# Patient Record
Sex: FEMALE | Race: WHITE
[De-identification: names, ages, dates, MRNs, and addresses within clinical notes are randomized per-mention and may not be internally consistent; named-entity substitution may affect disease eponyms.]

---

## 2017-06-26 ENCOUNTER — HOSPITAL ENCOUNTER (EMERGENCY)
Dept: HOSPITAL 25 - UCCORT | Age: 32
Discharge: HOME | End: 2017-06-26
Payer: COMMERCIAL

## 2017-06-26 VITALS — DIASTOLIC BLOOD PRESSURE: 72 MMHG | SYSTOLIC BLOOD PRESSURE: 145 MMHG

## 2017-06-26 DIAGNOSIS — J45.901: Primary | ICD-10-CM

## 2017-06-26 DIAGNOSIS — B34.9: ICD-10-CM

## 2017-06-26 DIAGNOSIS — G35: ICD-10-CM

## 2017-06-26 DIAGNOSIS — M32.9: ICD-10-CM

## 2017-06-26 PROCEDURE — G0463 HOSPITAL OUTPT CLINIC VISIT: HCPCS

## 2017-06-26 PROCEDURE — 71020: CPT

## 2017-06-26 PROCEDURE — 99212 OFFICE O/P EST SF 10 MIN: CPT

## 2017-06-26 NOTE — UC
Respiratory Complaint HPI





- HPI Summary


HPI Summary: 





This is a 33 yo female with lupus, MS and mild intermittent asthma who 

presented with a 3d h/o productive cough, malaise and chest tightness.  She 

reports chest tightness and thick yellow sputum production.  She has 

generalized feeling of malaise.  She has been using decongestants but did not 

trial use of bronchodilator.  Denies sick contacts.





- History of Current Complaint


Chief Complaint: UCGeneralIllness


Stated Complaint: UPPER RESPIRATORY


Hx Last Menstrual Period: doesn't get - on nexplanon





- Allergies/Home Medications


Allergies/Adverse Reactions: 


 Allergies











Allergy/AdvReac Type Severity Reaction Status Date / Time


 


Hydromorphone [From Dilaudid] Allergy  Difficulty Verified 06/26/17 14:31





   Breathing  


 


Penicillins [PCN] Allergy  Hives Verified 06/26/17 14:31











Home Medications: 


 Home Medications





Acetaminophen-Guaifenesin [Comtrex Deep Chest Cold M 325-200 mg] 1 tab PO Q6H 

PRN 06/26/17 [History Confirmed 06/26/17]


Cholecalciferol TAB* [Vitamin D TAB*] 4,000 units PO DAILY 06/26/17 [History 

Confirmed 06/26/17]


Etonogestrel [Nexplanon] 68 mg IMPLANT 06/26/17 [History]


Glatiramer Acetate [Copaxone] 40 mg SC SEE INSTRUCTIONS 06/26/17 [History 

Confirmed 06/26/17]











PMH/Surg Hx/FS Hx/Imm Hx


Previously Healthy: No - lupus, MS, mild intermittent asthma





- Surgical History


Surgical History: Yes


Surgery Procedure, Year, and Place: 3 c-sections.  gallbladder x2 surg.  

appendectomy





- Social History


Alcohol Use: None


Substance Use Type: None


Smoking Status (MU): Never Smoked Tobacco





Review of Systems


Constitutional: Fever, Chills, Fatigue


Skin: Negative


Eyes: Negative


ENT: Sore Throat


Respiratory: Shortness Of Breath, Cough


Cardiovascular: Negative


Gastrointestinal: Negative


Genitourinary: Negative


Motor: Negative


Neurovascular: Negative


Musculoskeletal: Negative


Neurological: Negative


Psychological: Negative


All Other Systems Reviewed And Are Negative: Yes





Physical Exam


Triage Information Reviewed: Yes


Appearance: Ill-Appearing - mildly


Vital Signs: 


 Initial Vital Signs











Temp  98.0 F   06/26/17 14:35


 


Pulse  97   06/26/17 14:35


 


Resp  16   06/26/17 14:35


 


BP  145/72   06/26/17 14:35


 


Pulse Ox  99   06/26/17 14:35











Vital Signs Reviewed: Yes


ENT: Positive: Normal ENT inspection


Neck: Positive: Supple, Nontender, No Lymphadenopathy


Respiratory: Positive: Lungs clear, Normal breath sounds, No respiratory 

distress.  Negative: Crackles, Rhonchi, Stridor, Wheezing


Cardiovascular Exam: Normal


Cardiovascular: Positive: RRR, No Murmur


Skin Exam: Normal


Skin: Negative: rashes





UC Diagnostic Evaluation





- Laboratory


O2 Sat by Pulse Oximetry: 99


Diagnostic Studies Comment: XR chest - NAD





Respiratory Course/Dx





- Course


Course Of Treatment: This is a 33 yo female with asthma, lupus, and MS who 

presents with 3 days of congestion, chest tightness cough and malaise.  No 

infiltrate on CXR.  Treat for viral illness with asthma exacerbation





- Differential Dx/Diagnosis


Differential Diagnosis/HQI/PQRI: Asthma, Bronchitis, Lower Resp Infection, 

Sinusitis


Provider Diagnoses: 1. Asthma exacerbation.  2. Viral respiratory illness





Discharge





- Discharge Plan


Condition: Stable


Disposition: HOME


Prescriptions: 


Albuterol HFA INHALER* [Ventolin HFA Inhaler*] 1 - 2 puff INH Q4H PRN #1 mdi


 PRN Reason: SOB/cough


Azithromyxin LUCINDA (NF) [Z-Lucinda (Zithromax) 250 mg tabs #6] 2 tab PO .TODAY, THEN 

1 DAILY #6 tab


Patient Education Materials:  Bronchospasm (ED)


Referrals: 


No Primary Care Phys,NOPCP [Primary Care Provider] - 


Additional Instructions: 


Acitivity: As tolerated





Instructions:


1. Use albuterol regularly for the next several days with decongestants and the 

Zpak


2. If symptoms continue for another 3-4 days, please follow up with your 

primary care provider for further testing

## 2017-06-26 NOTE — RAD
HISTORY: Cough



COMPARISONS: August 21, 2015



VIEWS: 2: Frontal dual-energy and lateral views of the chest.



FINDINGS:

CARDIOMEDIASTINAL SILHOUETTE: The cardiomediastinal silhouette is normal.

DEJON: The dejon are normal.

PLEURA: The costophrenic angles are sharp. No pleural abnormalities are noted.

LUNG PARENCHYMA: The lungs are clear.

ABDOMEN: The upper abdomen is clear. There is no subphrenic gas.

BONES AND SOFT TISSUES: No bone or soft tissue abnormalities are noted.

OTHER: None.



IMPRESSION:

NO ACTIVE CARDIOPULMONARY DISEASE.

## 2018-04-24 ENCOUNTER — HOSPITAL ENCOUNTER (EMERGENCY)
Dept: HOSPITAL 25 - UCCORT | Age: 33
Discharge: HOME | End: 2018-04-24
Payer: COMMERCIAL

## 2018-04-24 VITALS — SYSTOLIC BLOOD PRESSURE: 148 MMHG | DIASTOLIC BLOOD PRESSURE: 87 MMHG

## 2018-04-24 DIAGNOSIS — Z88.5: ICD-10-CM

## 2018-04-24 DIAGNOSIS — J20.9: Primary | ICD-10-CM

## 2018-04-24 DIAGNOSIS — Z88.0: ICD-10-CM

## 2018-04-24 PROCEDURE — 87651 STREP A DNA AMP PROBE: CPT

## 2018-04-24 PROCEDURE — 71046 X-RAY EXAM CHEST 2 VIEWS: CPT

## 2018-04-24 PROCEDURE — 99212 OFFICE O/P EST SF 10 MIN: CPT

## 2018-04-24 PROCEDURE — 87502 INFLUENZA DNA AMP PROBE: CPT

## 2018-04-24 PROCEDURE — G0463 HOSPITAL OUTPT CLINIC VISIT: HCPCS

## 2018-06-04 ENCOUNTER — HOSPITAL ENCOUNTER (EMERGENCY)
Dept: HOSPITAL 25 - UCCORT | Age: 33
Discharge: HOME | End: 2018-06-04
Payer: COMMERCIAL

## 2018-06-04 VITALS — SYSTOLIC BLOOD PRESSURE: 123 MMHG | DIASTOLIC BLOOD PRESSURE: 61 MMHG

## 2018-06-04 DIAGNOSIS — L98.8: Primary | ICD-10-CM

## 2018-06-04 PROCEDURE — 99212 OFFICE O/P EST SF 10 MIN: CPT

## 2018-06-04 PROCEDURE — 87186 SC STD MICRODIL/AGAR DIL: CPT

## 2018-06-04 PROCEDURE — 87205 SMEAR GRAM STAIN: CPT

## 2018-06-04 PROCEDURE — 87640 STAPH A DNA AMP PROBE: CPT

## 2018-06-04 PROCEDURE — G0463 HOSPITAL OUTPT CLINIC VISIT: HCPCS

## 2018-06-04 PROCEDURE — 87077 CULTURE AEROBIC IDENTIFY: CPT

## 2018-06-04 PROCEDURE — 87070 CULTURE OTHR SPECIMN AEROBIC: CPT

## 2018-06-04 PROCEDURE — 87641 MR-STAPH DNA AMP PROBE: CPT

## 2018-06-04 NOTE — UC
General HPI





- HPI Summary


HPI Summary: 





pt is c/o "lesions popping up all over". "looks like boils". denies hx of MRSA 

or hot tub use. no fever.  rash for about 1 week. multiple spots on front of 

trunk. hx Lupus, not sure if related. begins as red spots that get bigger then 

rupture.





- History of Current Complaint


Chief Complaint: UCSkin


Stated Complaint: BOIL ON STOMACH


Time Seen by Provider: 06/04/18 15:00


Hx Obtained From: Patient


Hx Last Menstrual Period: pt has nexplanon and states not getting a regular 

menses


Onset/Duration: Gradual Onset


Timing: Constant


Pain Intensity: 2


Associated Signs & Symptoms: Negative: Fever





- Allergy/Home Medications


Allergies/Adverse Reactions: 


 Allergies











Allergy/AdvReac Type Severity Reaction Status Date / Time


 


hydromorphone [From Dilaudid] Allergy  Difficulty Verified 06/04/18 15:16





   Breathing  


 


Penicillins Allergy  Hives Verified 06/04/18 15:16











Home Medications: 


 Home Medications





Folic Acid TAB* [Folvite TAB*] 1 mg PO DAILY 06/04/18 [History Confirmed 06/04/ 18]











PMH/Surg Hx/FS Hx/Imm Hx





- Additional Past Medical History


Additional PMH: 





MS, Lupus





- Surgical History


Surgical History: Yes


Surgery Procedure, Year, and Place: 3 c-sections.  gallbladder x2 surg.  

appendectomy





- Family History


Known Family History: Positive: Hypertension, Seizure Disorder, Other - 

connective tissue disease, PE





- Social History


Occupation: Employed Full-time


Lives: With Family


Alcohol Use: Rare


Substance Use Type: None


Smoking Status (MU): Never Smoked Tobacco





- Immunization History


Vaccination Up to Date: Yes





Review of Systems


Constitutional: Negative


Skin: Rash


Eyes: Negative


ENT: Negative


Respiratory: Negative


Cardiovascular: Negative


Gastrointestinal: Negative


Genitourinary: Negative


Motor: Negative


Neurovascular: Negative


Musculoskeletal: Negative


Neurological: Negative


Psychological: Negative


All Other Systems Reviewed And Are Negative: Yes





Physical Exam


Triage Information Reviewed: Yes


Appearance: Well-Appearing


Vital Signs: 


 Initial Vital Signs











Temp  97.3 F   06/04/18 15:07


 


Pulse  98   06/04/18 15:07


 


Resp  17   06/04/18 15:07


 


BP  123/61   06/04/18 15:07


 


Pulse Ox  100   06/04/18 15:07











Vital Signs Reviewed: Yes


Eyes: Positive: Conjunctiva Clear


ENT: Positive: Pharynx normal, TMs normal.  Negative: Nasal congestion, Nasal 

drainage


Neck: Positive: Supple, Nontender, No Lymphadenopathy


Respiratory: Positive: Lungs clear, Normal breath sounds


Cardiovascular: Positive: RRR, No Murmur


Abdomen Description: Positive: Nontender, No Organomegaly, Soft.  Negative: 

Distended, Guarding


Bowel Sounds: Positive: Present


Musculoskeletal: Positive: ROM Intact


Neurological: Positive: Alert


Psychological: Positive: Age Appropriate Behavior


Skin Exam: Normal


Skin: Positive: rashes - several red spots to anterior trunk, some with tiny 

pustules and some scabbed over. not petechial or blistering. No scale.





Course/Dx





- Course


Course Of Treatment: does not look fungal and not c/w infestation. not typical 

of Lupus. ? MRSA.  PROCEDURE: tip of tiny needle used to unroof 3 tiny pustules 

and culture obtained.  will cover for mrsa and refer to dermatology.





- Differential Dx - Multi-Symptom


Provider Diagnoses: small pustules anterior trunk/abdomen





Discharge





- Sign-Out/Discharge


Documenting (check all that apply): Discharge/Admit/Transfer





- Discharge Plan


Condition: Stable


Disposition: HOME


Prescriptions: 


Sulfamethox/Trimethoprim DS* [Bactrim /160 TAB*] 1 tab PO BID #14 tab


Patient Education Materials:  MRSA (Methicillin-Resistant Staphylococcus Aureus

) (ED), Acute Rash (ED)


Referrals: 


Gildardo Cordon MD [Medical Doctor] - 7 Days





- Billing Disposition and Condition


Condition: STABLE


Disposition: Home

## 2018-11-19 ENCOUNTER — HOSPITAL ENCOUNTER (EMERGENCY)
Dept: HOSPITAL 25 - UCCORT | Age: 33
Discharge: HOME | End: 2018-11-19
Payer: COMMERCIAL

## 2018-11-19 VITALS — SYSTOLIC BLOOD PRESSURE: 147 MMHG | DIASTOLIC BLOOD PRESSURE: 79 MMHG

## 2018-11-19 DIAGNOSIS — B96.20: ICD-10-CM

## 2018-11-19 DIAGNOSIS — Z88.0: ICD-10-CM

## 2018-11-19 DIAGNOSIS — N39.0: Primary | ICD-10-CM

## 2018-11-19 DIAGNOSIS — Z88.5: ICD-10-CM

## 2018-11-19 LAB
HCT VFR BLD AUTO: 40 % (ref 35–47)
HGB BLD-MCNC: 13.3 G/DL (ref 12–16)
MCH RBC QN AUTO: 29 PG (ref 27–31)
MCHC RBC AUTO-ENTMCNC: 34 G/DL (ref 31–36)
MCV RBC AUTO: 87 FL (ref 80–97)
PLATELET # BLD AUTO: 235 10^3/UL (ref 150–450)
RBC # BLD AUTO: 4.56 10^6/UL (ref 4–5.4)
WBC # BLD AUTO: 6.5 10^3/UL (ref 3.5–10.8)

## 2018-11-19 PROCEDURE — 87186 SC STD MICRODIL/AGAR DIL: CPT

## 2018-11-19 PROCEDURE — G0463 HOSPITAL OUTPT CLINIC VISIT: HCPCS

## 2018-11-19 PROCEDURE — 81003 URINALYSIS AUTO W/O SCOPE: CPT

## 2018-11-19 PROCEDURE — 85027 COMPLETE CBC AUTOMATED: CPT

## 2018-11-19 PROCEDURE — 36415 COLL VENOUS BLD VENIPUNCTURE: CPT

## 2018-11-19 PROCEDURE — 99212 OFFICE O/P EST SF 10 MIN: CPT

## 2018-11-19 PROCEDURE — 80053 COMPREHEN METABOLIC PANEL: CPT

## 2018-11-19 PROCEDURE — 84702 CHORIONIC GONADOTROPIN TEST: CPT

## 2018-11-19 PROCEDURE — 87086 URINE CULTURE/COLONY COUNT: CPT

## 2018-11-19 PROCEDURE — 87077 CULTURE AEROBIC IDENTIFY: CPT

## 2018-11-19 NOTE — UC
Complaint Female HPI





- HPI Summary


HPI Summary: 





34 y/o female with h/o gastric bypass 10/2018, lupus, presents wtih dark urine 

x 4-5 days.  Patient states noted decreased urine output several days ago x 24 

hours, urine increased in amount but very dark  + L flank pain, achy, moderate.

   Denies colicy pain.   h/o URI x 1-2 weeks ago, resolved on own.   NO urinary 

burning, frequency, urgency.    + bloudy, no odor.   





- History Of Current Complaint


Chief Complaint: UCGU


Stated Complaint: URINARY


Time Seen by Provider: 11/19/18 14:46


Hx Obtained From: Patient


Hx Last Menstrual Period: 11/7/18


Pregnant?: No


Onset/Duration: Sudden Onset, Lasting Days


Timing: Constant


Severity Initially: Moderate


Severity Currently: Moderate


Pain Intensity: 5


Pain Scale Used: 0-10 Numeric





- Allergies/Home Medications


Allergies/Adverse Reactions: 


 Allergies











Allergy/AdvReac Type Severity Reaction Status Date / Time


 


hydromorphone [From Dilaudid] Allergy  Difficulty Verified 11/19/18 14:43





   Breathing  


 


Penicillins Allergy  Hives Verified 11/19/18 14:43











Home Medications: 


 Home Medications





Multivitamin [Multivitamins] 1 each PO DAILY 11/19/18 [History Confirmed 11/19/ 18]











PMH/Surg Hx/FS Hx/Imm Hx


Previously Healthy: No - lupus, s/p gastric bypass 





- Surgical History


Surgical History: Yes


Surgery Procedure, Year, and Place: 3 c-sections.  gallbladder x2 surg.  

appendectomy.  Gastric bypass 10/18





- Family History


Known Family History: Positive: Hypertension, Seizure Disorder, Other - 

connective tissue disease, PE





- Social History


Alcohol Use: Rare


Substance Use Type: None


Smoking Status (MU): Never Smoked Tobacco





- Immunization History


Vaccination Up to Date: Yes





Review of Systems


All Other Systems Reviewed And Are Negative: Yes


Constitutional: Negative: Fever, Chills


Genitourinary: Positive: Other - decrased urine output, dark urine.  Negative: 

Dysuria, Hematuria, Frequency, Urgency


Is Patient Immunocompromised?: No





Physical Exam


Triage Information Reviewed: Yes


Appearance: Well-Appearing, No Pain Distress, Well-Nourished


Vital Signs: 


 Initial Vital Signs











Temp  98.6 F   11/19/18 14:35


 


Pulse  98   11/19/18 14:35


 


Resp  16   11/19/18 14:35


 


BP  147/79   11/19/18 14:35


 


Pulse Ox  100   11/19/18 14:35











Eyes: Positive: Conjunctiva Clear


Abdomen Description: Positive: Nontender, No Organomegaly, Soft, Bruit, CVA 

Tenderness (L) - minimal, Other: - TTP over SI joint b/l with moderate 

palpation..  Negative: CVA Tenderness (R), Distended, Guarding, Hepatomegaly, 

McBurney's Point Tenderness, Splenomegaly


Musculoskeletal Exam: Normal


Psychological Exam: Normal





 Complaint Female Dx





- Course


Course Of Treatment: Due to decreased UO, labs sent, r/o nephrotic syndrome.   

+ leuk, + blood, treat for UTI, urine cultures sent, started on ABX, increase 

fluid intake.  Discussed with DR. Bowie.





- Differential Dx/Diagnosis


Differential Diagnosis/HQI/PQRI: Cervicitis, Ectopic, Ovarian Cyst, Renal Colic


Provider Diagnoses: UTI





Discharge





- Sign-Out/Discharge


Documenting (check all that apply): Patient Departure


All imaging exams completed and their final reports reviewed: No Studies





- Discharge Plan


Condition: Good


Disposition: HOME


Prescriptions: 


Nitrofurantoin Monohyd/M-Cryst [Macrobid 100 mg Capsule] 100 mg PO BID #6 cap


Patient Education Materials:  Urinary Tract Infection in Women (ED)


Referrals: 


No Primary Care Phys,NOPCP [Primary Care Provider] - 


Additional Instructions: 


- Antibiotics as directed x 3 days 


- BLood work, urine cultures to be completed, results should be completed in 1-

2 days 


- Return with decreased orine output, increased pain, increased swelling, chest 

pain 


- Increase fluid intake  


- Avoid NSAIDS (motrin, ibuprofen, alleve) until results returned 








- Billing Disposition and Condition


Condition: GOOD


Disposition: Home

## 2018-11-22 NOTE — UC
- Progress Note


Progress Note: 





+ e. coli


on macrobid


+ sensitive


no change


ljj 11/22/2018





Discharge





- Sign-Out/Discharge


Documenting (check all that apply): Post-Discharge Follow Up


All imaging exams completed and their final reports reviewed: No Studies





- Discharge Plan


Condition: Good


Disposition: HOME


Prescriptions: 


Nitrofurantoin Monohyd/M-Cryst [Macrobid 100 mg Capsule] 100 mg PO BID #6 cap


Patient Education Materials:  Urinary Tract Infection in Women (ED)


Referrals: 


No Primary Care Phys,NOPCP [Primary Care Provider] - 


Additional Instructions: 


- Antibiotics as directed x 3 days 


- BLood work, urine cultures to be completed, results should be completed in 1-

2 days 


- Return with decreased orine output, increased pain, increased swelling, chest 

pain 


- Increase fluid intake  


- Avoid NSAIDS (motrin, ibuprofen, alleve) until results returned 








- Billing Disposition and Condition


Condition: GOOD


Disposition: Home

## 2019-11-09 ENCOUNTER — HOSPITAL ENCOUNTER (EMERGENCY)
Dept: HOSPITAL 25 - UCCORT | Age: 34
Discharge: HOME | End: 2019-11-09
Payer: COMMERCIAL

## 2019-11-09 VITALS — SYSTOLIC BLOOD PRESSURE: 120 MMHG | DIASTOLIC BLOOD PRESSURE: 70 MMHG

## 2019-11-09 DIAGNOSIS — Z88.5: ICD-10-CM

## 2019-11-09 DIAGNOSIS — Z88.0: ICD-10-CM

## 2019-11-09 DIAGNOSIS — G35: ICD-10-CM

## 2019-11-09 DIAGNOSIS — J18.9: Primary | ICD-10-CM

## 2019-11-09 PROCEDURE — G0463 HOSPITAL OUTPT CLINIC VISIT: HCPCS

## 2019-11-09 PROCEDURE — 71046 X-RAY EXAM CHEST 2 VIEWS: CPT

## 2019-11-09 PROCEDURE — 84702 CHORIONIC GONADOTROPIN TEST: CPT

## 2019-11-09 PROCEDURE — 99212 OFFICE O/P EST SF 10 MIN: CPT

## 2019-11-09 NOTE — UC
Respiratory Complaint HPI





- HPI Summary


HPI Summary: 


Patient  is 34 year old female, who  present today  to the urgent care with 

cough for past  4 days. 


Reports Coughing, fatigue, body aches, weak, sinus congestion  Pt reports 

albuterol nebulizer she did last night after her inhaler treatment helped.


Cough is productive of sputum.


Reports of fever, MAXIMUM TEMPERATURE at 100F yesterday


Mild sore throat


Denies any new soap, detergent , cosmetics, food or a possible exposure. 


Denies any chest pain or shortness of breath . Denies any abdominal pain , 

nausea or vomiting , diarrhea or constipation.  














- History of Current Complaint


Chief Complaint: UCGeneralIllness


Stated Complaint: FEVER,CONGESTION,COUGH


Time Seen by Provider: 19 12:24


Hx Obtained From: Patient


Hx Last Menstrual Period: 19


Pregnant?: No


Pain Intensity: 0





- Allergies/Home Medications


Allergies/Adverse Reactions: 


 Allergies











Allergy/AdvReac Type Severity Reaction Status Date / Time


 


hydromorphone [From Dilaudid] Allergy  Difficulty Verified 19 12:28





   Breathing  


 


Penicillins Allergy  Hives Verified 19 12:28











Home Medications: 


 Home Medications





Acetaminophen [Tylenol Extra Strength] 1 tab PO ONCE 19 [History 

Confirmed 19]











PMH/Surg Hx/FS Hx/Imm Hx





- Additional Past Medical History


Additional PMH: 


Past Medical History : Lupus, MS


Past Surgical History: , , cholecystectomy, appendicectomy, gastric 

bypass


Family History : non contributory 


Social History : rare alcohol, non smoker, no drug use.  








Previously Healthy: Yes





- Surgical History


Surgical History: Yes


Surgery Procedure, Year, and Place: 3 c-sections.  gallbladder x2 surg.  

appendectomy.  Gastric bypass 10/18





- Family History


Known Family History: Positive: Hypertension, Seizure Disorder, Other - 

connective tissue disease, PE, Non-Contributory





- Social History


Alcohol Use: Rare


Substance Use Type: None


Smoking Status (MU): Never Smoked Tobacco





- Immunization History


Vaccination Up to Date: Yes





Review of Systems


All Other Systems Reviewed And Are Negative: Yes


Constitutional: Positive: Fever, Fatigue


Skin: Positive: Negative


Eyes: Positive: Negative


ENT: Positive: Negative, Sore Throat - Mild, Sinus Congestion


Respiratory: Positive: Cough - Productive.  Negative: Shortness Of Breath


Cardiovascular: Positive: Negative.  Negative: Chest Pain


Gastrointestinal: Positive: Negative


Genitourinary: Positive: Negative


Motor: Positive: Negative


Neurovascular: Positive: Negative


Musculoskeletal: Positive: Negative


Neurological: Positive: Negative


Psychological: Positive: Negative


Is Patient Immunocompromised?: No





Physical Exam





- Summary


Physical Exam Summary: 


Physical Exam: 


Const: Appears well. No signs of apparent distress present. Alert and oriented 

x 3.


Musculo: Walks with a normal gait.


Head/Face: Atraumatic, normocephalic on inspection.


Eyes: EOMI and PERRLA  in both eyes. Conjunctivae clear. No discharge noted 


ENT: Hearing normal, TM normal appearing bilaterally


No tenderness  to palpation on maxillary and frontal sinus. 


Minimal if any pharyngeal erythema without any exudates . Uvula is midline. 


No cervical or submandibular lymphadenopathy noted. 


Respiratory: Respirations are unlabored.  Decreased air entry in the right 

lower lobe with mild rhonchi and crackles.  No wheezing noted 


CVS:  Regular rate and Rhythm, S1S2 normal , no murmurs identified. 


Extremities: Peripheral circulation is grossly normal. Pulses 2+


Abdomen : Soft non tender ,  nondistended ,  Bowel sounds present .  No 

guarding , rebound tenderness  or  rigidity noted. 


Skin: No lesions or rash located on the upper extremities or on the lower 

extremities. 


Neuro: Cranial nerves  II to XII intact, motor and sensory intact.  DTR Intact  

bilaterally. Mood is normal. Affect is normal.








Triage Information Reviewed: Yes


Vital Signs: 


 Initial Vital Signs











Temp  97.1 F   19 12:29


 


Pulse  85   19 12:29


 


Resp  18   19 12:29


 


BP  120/70   19 12:29


 


Pulse Ox  100   19 12:29











Vital Signs Reviewed: Yes





Diagnostics





- Radiology


  ** No standard instances


Radiology Interpretation Completed By: Radiologist - Chest x-ray: IMPRESSION: 

NO ACTIVE CARDIOPULMONARY DISEASE.





Respiratory Course/Dx





- Course


Course Of Treatment: 


Urine pregnancy test is negative


Chest x-ray: IMPRESSION: NO ACTIVE CARDIOPULMONARY DISEASE.


She was given 1 nebulizer treatment of albuterol and she is feeling much better.


Based on her clinical exam and history of lupus and MS, I will treat her with 

antibiotics at this time.  e prescribed to the pharmacy











- Differential Dx/Diagnosis


Provider Diagnosis: 


 Atypical pneumonia








Discharge ED





- Sign-Out/Discharge


Documenting (check all that apply): Patient Departure


All imaging exams completed and their final reports reviewed: Yes





- Discharge Plan


Condition: Stable


Disposition: HOME


Prescriptions: 


Albuterol 2.5MG/3ML (0.083%)* [Ventolin 2.5 MG/3 ML NEB.SOL*] 2.5 mg INH Q6H 

PRN 7 Days #1 neb.sol


 PRN Reason: Shortness Of Breath


Azithromyxin LUCINDA (NF) [Z-Lucinda (Zithromax) 250 mg tabs #6] 2 tab PO .TODAY, THEN 

1 DAILY #6 tab


Patient Education Materials:  Pneumonia (ED)


Referrals: 


No Primary Care Phys,NOPCP [Primary Care Provider] - 


Additional Instructions: 


Please start taking the medication as prescribed to the pharmacy . 


Albuterol nebulizer solution has been E prescribed.  Can use it every 6 hours 

as needed.


Follow up with your primary care doctor in 1 week ( [facility]) 


Return to Urgent care / ER if symptoms get worse. 








- Billing Disposition and Condition


Condition: STABLE


Disposition: Home

## 2019-12-11 ENCOUNTER — HOSPITAL ENCOUNTER (EMERGENCY)
Dept: HOSPITAL 25 - UCCORT | Age: 34
Discharge: HOME | End: 2019-12-11
Payer: COMMERCIAL

## 2019-12-11 VITALS — SYSTOLIC BLOOD PRESSURE: 134 MMHG | DIASTOLIC BLOOD PRESSURE: 67 MMHG

## 2019-12-11 DIAGNOSIS — Z88.0: ICD-10-CM

## 2019-12-11 DIAGNOSIS — M79.10: ICD-10-CM

## 2019-12-11 DIAGNOSIS — J02.9: Primary | ICD-10-CM

## 2019-12-11 DIAGNOSIS — R51: ICD-10-CM

## 2019-12-11 DIAGNOSIS — Z20.818: ICD-10-CM

## 2019-12-11 DIAGNOSIS — Z88.5: ICD-10-CM

## 2019-12-11 PROCEDURE — 99212 OFFICE O/P EST SF 10 MIN: CPT

## 2019-12-11 PROCEDURE — G0463 HOSPITAL OUTPT CLINIC VISIT: HCPCS

## 2019-12-11 PROCEDURE — 87651 STREP A DNA AMP PROBE: CPT

## 2019-12-11 NOTE — UC
Throat Pain/Nasal James HPI





- HPI Summary


HPI Summary: 


Patient is a 35yo female presenting with sore throat, HA, chills, and bodaches 

x1 day. Patient states that 5 out of 7 people at home have tested positive for 

strep throat and are currently being treated. Patient denies nasal congestion, 

ear pain, and cough. Unsure of any fevers. Denies relief with ibuprofen. 

Patient states that she is leaving for [place] on Friday and asked if she 

may be able to receive antibiotics still if the strep test is negative.








- History of Current Complaint


Chief Complaint: UCRespiratory


Stated Complaint: FEVER/SORE THROAT


Hx Obtained From: Patient


Hx Last Menstrual Period: 12/11/19


Onset/Duration: Sudden Onset


Severity: Moderate


Pain Intensity: 6


Pain Scale Used: 0-10 Numeric





- Allergies/Home Medications


Allergies/Adverse Reactions: 


 Allergies











Allergy/AdvReac Type Severity Reaction Status Date / Time


 


hydromorphone [From Dilaudid] Allergy  Difficulty Verified 12/11/19 16:45





   Breathing  


 


Penicillins Allergy  Hives Verified 12/11/19 16:45














PMH/Surg Hx/FS Hx/Imm Hx


Previously Healthy: Yes





- Surgical History


Surgical History: Yes


Surgery Procedure, Year, and Place: 3 c-sections.  gallbladder x2 surg.  

appendectomy.  Gastric bypass 10/18





- Family History


Known Family History: Positive: Hypertension, Seizure Disorder, Other - 

connective tissue disease, PE, Non-Contributory





- Social History


Alcohol Use: Rare


Substance Use Type: None


Smoking Status (MU): Never Smoked Tobacco





- Immunization History


Vaccination Up to Date: Yes





Review of Systems


All Other Systems Reviewed And Are Negative: Yes


Constitutional: Positive: Chills, Fatigue


ENT: Positive: Sore Throat.  Negative: Ear Ache, Nasal Discharge, Sinus 

Congestion


Respiratory: Positive: Negative.  Negative: Shortness Of Breath, Cough


Cardiovascular: Positive: Negative


Gastrointestinal: Positive: Negative


Musculoskeletal: Positive: Myalgia - general body aches


Neurological: Positive: Headache





Physical Exam


Triage Information Reviewed: Yes


Appearance: Well-Appearing, No Pain Distress, Well-Nourished


Vital Signs: 


 Initial Vital Signs











Temp  98.7 F   12/11/19 16:46


 


Pulse  78   12/11/19 16:46


 


Resp  18   12/11/19 16:46


 


BP  134/67   12/11/19 16:46


 


Pulse Ox  100   12/11/19 16:46








 Lab Results











  12/11/19 Range/Units





  16:48 


 


Group A Strep Rapid  Negative  (Negative)  











Vital Signs Reviewed: Yes


Eyes: Positive: Conjunctiva Clear


ENT: Positive: Hearing grossly normal, Pharyngeal erythema, TMs normal, 

Tonsillar swelling, Uvula midline.  Negative: Nasal congestion, Nasal drainage, 

Tonsillar exudate


Neck: Positive: Supple, Nontender, Enlarged Nodes @


Respiratory Exam: Normal


Respiratory: Positive: Lungs clear, Normal breath sounds, No respiratory 

distress, No accessory muscle use.  Negative: Crackles, Rhonchi, Stridor, 

Wheezing


Cardiovascular Exam: Normal


Cardiovascular: Positive: RRR


Neurological: Positive: Alert


Psychological: Positive: Age Appropriate Behavior


Skin Exam: Normal





Throat Pain/Nasal Course/Dx





- Course


Course Of Treatment: 


Negative rapid strep test. I treated patient with Keflex for symptomatic strep 

throat exposure. Patient has allergy of hives reaction to penicillins but 

states she has taken Keflex in the past without issue. Instructed to continue 

with symptomatic treatment and follow up with pcp if symptoms persist. Patient 

voiced understanding and agreed with treatment plan.








- Differential Dx/Diagnosis


Differential Diagnosis/HQI/PQRI: Pharyngitis, Tonsillitis, URI


Provider Diagnosis: 


 Exposure to strep throat, Sore throat








Discharge ED





- Sign-Out/Discharge


Documenting (check all that apply): Patient Departure


All imaging exams completed and their final reports reviewed: No Studies





- Discharge Plan


Condition: Stable


Disposition: HOME


Prescriptions: 


Cephalexin CAP* [Keflex CAP*] 500 mg PO BID #20 cap


Patient Education Materials:  Pharyngitis (ED)


Referrals: 


[facility] [Outside]


[facility] [Outside]


Additional Instructions: 


As discussed, you tested negative for strep throat today but will be treated 

due to your exposure to strep throat.


Take Keflex as prescribed for the treatment of possible strep throat.


You may take ibuprofen and/or tylenol as directed for fever and pain relief.


You may use over the counter throat sprays or lozenges for symptomatic relief.


Get plenty of rest and fluids.


Follow up with your primary care provider or one of the referrals listed below 

if symptoms do not resolve within 10 days.








- Billing Disposition and Condition


Condition: STABLE


Disposition: Home

## 2022-06-13 NOTE — UC
Respiratory Complaint HPI





- HPI Summary


HPI Summary: 





33 yo female with 1 1/2 day hx of f/c, myalgias, runny nose, sore throat and 

blood tinged sputum





had flu 4-5 mos ago complicated by pneumonia





no n/v/d





hx MS and LUPUS











- History of Current Complaint


Chief Complaint: UCRespiratory


Stated Complaint: COUGH, FEVER, ACHY


Time Seen by Provider: 04/24/18 16:54


Hx Obtained From: Patient


Hx Last Menstrual Period: implanon


Onset/Duration: Sudden Onset, Lasting Days


Timing: Constant


Severity Initially: Mild


Severity Currently: Moderate


Pain Intensity: 4


Pain Scale Used: 0-10 Numeric


Character: Cough: Productive, Sputum Description: - green and blood tinged


Aggravating Factors: Nothing


Alleviating Factors: Nothing


Associated Signs And Symptoms: Positive: Fever, Chills, Nasal Congestion


Related History: Similar Episode/Dx as: - influenza





- Allergies/Home Medications


Allergies/Adverse Reactions: 


 Allergies











Allergy/AdvReac Type Severity Reaction Status Date / Time


 


hydromorphone [From Dilaudid] Allergy  Difficulty Verified 04/24/18 16:45





   Breathing  


 


Penicillins Allergy  Hives Verified 04/24/18 16:45











Home Medications: 


 Home Medications





Methotrexate TAB* 4 tab WEEKLY 04/24/18 [History Confirmed 04/24/18]











PMH/Surg Hx/FS Hx/Imm Hx


Previously Healthy: Yes - LUPUS/MS


Respiratory History: Asthma, Bronchitis, Pneumonia





- Surgical History


Surgical History: Yes


Surgery Procedure, Year, and Place: 3 c-sections.  gallbladder x2 surg.  

appendectomy





- Family History


Known Family History: Positive: Hypertension, Other - connective tissue disease





- Social History


Alcohol Use: Rare


Substance Use Type: None


Smoking Status (MU): Never Smoked Tobacco





Review of Systems


Constitutional: Fever, Chills, Fatigue


Skin: Negative


Eyes: Negative


ENT: Sore Throat, Nasal Discharge, Sinus Congestion


Respiratory: Cough


Cardiovascular: Negative


Gastrointestinal: Negative


Genitourinary: Negative


Motor: Negative


Neurovascular: Negative


Musculoskeletal: Myalgia


Neurological: Headache


Psychological: Negative


Is Patient Immunocompromised?: No


All Other Systems Reviewed And Are Negative: Yes





Physical Exam


Triage Information Reviewed: Yes


Appearance: Well-Appearing, No Pain Distress, Well-Nourished


Vital Signs: 


 Initial Vital Signs











Temp  98 F   04/24/18 16:49


 


Pulse  102   04/24/18 16:49


 


Resp  16   04/24/18 16:49


 


BP  148/87   04/24/18 16:49


 


Pulse Ox  100   04/24/18 16:49











Vital Signs Reviewed: Yes


Eyes: Positive: Conjunctiva Clear


ENT: Positive: Nasal congestion, Nasal drainage, Tonsillar swelling, Uvula 

midline.  Negative: Trismus, Muffled voice, Hoarse voice


Neck: Positive: Supple, Nontender, No Lymphadenopathy


Respiratory: Positive: Lungs clear, Normal breath sounds, No respiratory 

distress, No accessory muscle use


Cardiovascular: Positive: RRR, No Murmur


Musculoskeletal Exam: Normal


Neurological: Positive: Alert


Psychological Exam: Normal


Skin Exam: Normal





[facility] Diagnostic Evaluation





- Laboratory


Pertinent Lab Values Are: WNL - influenza (-), strep (-)


O2 Sat by Pulse Oximetry: 100 - normal/not hypoxic





Respiratory Course/Dx





- Course


Course Of Treatment: Reference #: [id number]





- Differential Dx/Diagnosis


Provider Diagnoses: acute bronchitis





Discharge





- Sign-Out/Discharge


Documenting (check all that apply): Discharge/Admit/Transfer





- Discharge Plan


Condition: Stable


Disposition: HOME


Patient Education Materials:  Acute Bronchitis (ED)


Referrals: 


No Primary Care Phys,NOPCP [Primary Care Provider] - 





- Billing Disposition and Condition


Condition: STABLE


Disposition: HOME
done